# Patient Record
Sex: FEMALE
[De-identification: names, ages, dates, MRNs, and addresses within clinical notes are randomized per-mention and may not be internally consistent; named-entity substitution may affect disease eponyms.]

---

## 2019-12-05 ENCOUNTER — APPOINTMENT (OUTPATIENT)
Dept: HUMAN REPRODUCTION | Facility: CLINIC | Age: 44
End: 2019-12-05
Payer: COMMERCIAL

## 2019-12-05 PROCEDURE — 36415 COLL VENOUS BLD VENIPUNCTURE: CPT

## 2019-12-05 PROCEDURE — 76830 TRANSVAGINAL US NON-OB: CPT

## 2019-12-05 PROCEDURE — 99213 OFFICE O/P EST LOW 20 MIN: CPT | Mod: 25

## 2019-12-11 ENCOUNTER — APPOINTMENT (OUTPATIENT)
Dept: HUMAN REPRODUCTION | Facility: CLINIC | Age: 44
End: 2019-12-11
Payer: COMMERCIAL

## 2019-12-11 PROCEDURE — 76830 TRANSVAGINAL US NON-OB: CPT

## 2019-12-11 PROCEDURE — 99203 OFFICE O/P NEW LOW 30 MIN: CPT | Mod: 25

## 2019-12-11 PROCEDURE — 36415 COLL VENOUS BLD VENIPUNCTURE: CPT

## 2019-12-13 ENCOUNTER — APPOINTMENT (OUTPATIENT)
Dept: HUMAN REPRODUCTION | Facility: CLINIC | Age: 44
End: 2019-12-13
Payer: COMMERCIAL

## 2019-12-13 PROCEDURE — 76830 TRANSVAGINAL US NON-OB: CPT

## 2019-12-13 PROCEDURE — 36415 COLL VENOUS BLD VENIPUNCTURE: CPT

## 2019-12-16 ENCOUNTER — APPOINTMENT (OUTPATIENT)
Dept: HUMAN REPRODUCTION | Facility: CLINIC | Age: 44
End: 2019-12-16
Payer: COMMERCIAL

## 2019-12-16 PROCEDURE — 36415 COLL VENOUS BLD VENIPUNCTURE: CPT

## 2019-12-16 PROCEDURE — 76830 TRANSVAGINAL US NON-OB: CPT

## 2020-02-11 ENCOUNTER — APPOINTMENT (OUTPATIENT)
Dept: HUMAN REPRODUCTION | Facility: CLINIC | Age: 45
End: 2020-02-11
Payer: COMMERCIAL

## 2020-02-11 PROCEDURE — 36415 COLL VENOUS BLD VENIPUNCTURE: CPT

## 2020-02-11 PROCEDURE — 76830 TRANSVAGINAL US NON-OB: CPT

## 2020-02-13 ENCOUNTER — APPOINTMENT (OUTPATIENT)
Dept: HUMAN REPRODUCTION | Facility: CLINIC | Age: 45
End: 2020-02-13
Payer: COMMERCIAL

## 2020-02-13 PROCEDURE — 76830 TRANSVAGINAL US NON-OB: CPT

## 2020-02-13 PROCEDURE — 36415 COLL VENOUS BLD VENIPUNCTURE: CPT

## 2020-02-13 PROCEDURE — 99213 OFFICE O/P EST LOW 20 MIN: CPT | Mod: 25

## 2020-02-27 PROBLEM — Z00.00 ENCOUNTER FOR PREVENTIVE HEALTH EXAMINATION: Status: ACTIVE | Noted: 2020-02-27

## 2022-05-24 ENCOUNTER — OFFICE VISIT (OUTPATIENT)
Dept: RHEUMATOLOGY | Facility: CLINIC | Age: 47
End: 2022-05-24
Payer: COMMERCIAL

## 2022-05-24 ENCOUNTER — HOSPITAL ENCOUNTER (OUTPATIENT)
Dept: RADIOLOGY | Facility: HOSPITAL | Age: 47
Discharge: HOME | End: 2022-05-24
Attending: STUDENT IN AN ORGANIZED HEALTH CARE EDUCATION/TRAINING PROGRAM
Payer: COMMERCIAL

## 2022-05-24 ENCOUNTER — PATIENT MESSAGE (OUTPATIENT)
Dept: RHEUMATOLOGY | Facility: CLINIC | Age: 47
End: 2022-05-24

## 2022-05-24 VITALS
DIASTOLIC BLOOD PRESSURE: 70 MMHG | HEART RATE: 62 BPM | BODY MASS INDEX: 22.99 KG/M2 | SYSTOLIC BLOOD PRESSURE: 105 MMHG | WEIGHT: 138 LBS | HEIGHT: 65 IN

## 2022-05-24 DIAGNOSIS — M25.542 ARTHRALGIA OF BOTH HANDS: ICD-10-CM

## 2022-05-24 DIAGNOSIS — O03.9 MISCARRIAGE: ICD-10-CM

## 2022-05-24 DIAGNOSIS — M25.541 ARTHRALGIA OF BOTH HANDS: ICD-10-CM

## 2022-05-24 DIAGNOSIS — O03.9 MISCARRIAGE: Primary | ICD-10-CM

## 2022-05-24 PROCEDURE — 73130 X-RAY EXAM OF HAND: CPT | Mod: TC,50

## 2022-05-24 PROCEDURE — 73630 X-RAY EXAM OF FOOT: CPT | Mod: 26,,, | Performed by: RADIOLOGY

## 2022-05-24 PROCEDURE — 99204 OFFICE O/P NEW MOD 45 MIN: CPT | Mod: S$GLB,,, | Performed by: STUDENT IN AN ORGANIZED HEALTH CARE EDUCATION/TRAINING PROGRAM

## 2022-05-24 PROCEDURE — 99999 PR PBB SHADOW E&M-NEW PATIENT-LVL III: ICD-10-PCS | Mod: PBBFAC,,, | Performed by: STUDENT IN AN ORGANIZED HEALTH CARE EDUCATION/TRAINING PROGRAM

## 2022-05-24 PROCEDURE — 73130 XR HAND COMPLETE 3 VIEWS BILATERAL: ICD-10-PCS | Mod: 26,,, | Performed by: RADIOLOGY

## 2022-05-24 PROCEDURE — 73630 X-RAY EXAM OF FOOT: CPT | Mod: TC,50

## 2022-05-24 PROCEDURE — 99204 PR OFFICE/OUTPT VISIT, NEW, LEVL IV, 45-59 MIN: ICD-10-PCS | Mod: S$GLB,,, | Performed by: STUDENT IN AN ORGANIZED HEALTH CARE EDUCATION/TRAINING PROGRAM

## 2022-05-24 PROCEDURE — 73630 XR FOOT COMPLETE 3 VIEW BILATERAL: ICD-10-PCS | Mod: 26,,, | Performed by: RADIOLOGY

## 2022-05-24 PROCEDURE — 99999 PR PBB SHADOW E&M-NEW PATIENT-LVL III: CPT | Mod: PBBFAC,,, | Performed by: STUDENT IN AN ORGANIZED HEALTH CARE EDUCATION/TRAINING PROGRAM

## 2022-05-24 PROCEDURE — 73130 X-RAY EXAM OF HAND: CPT | Mod: 26,,, | Performed by: RADIOLOGY

## 2022-05-24 NOTE — PROGRESS NOTES
Subjective:       Patient ID: Alexandra Leon is a 47 y.o. female.    Chief Complaint: miscarriages    HPI   Patient is a 48 yo F with chronic constipation and fertility difficulty who presents for Rheumatology evaluation for miscarriage. She's from [place] but lives in [place] and frequently travels to [place]. She's doing IVF and had 2 implanted embryos fail. Both embryos were genetically tested and were normal. One embryo didn't implant at all and the 2nd one miscarried after 3 weeks. She also has joint pain and her paternal grandmother has RA so she was asked to have Rheumatology evaluation for any autoimmune disease that would cause infertility or pregnancy losses.    She endorses pain in the wrists, fingers, and right great toe. Pain in wrists in the morning and worse with activity like when carrying heavy objects. Hand pain in the fingers which is worse with activity. Denies morning stiffness. Swelling in the hands in the mornings only and has trouble making a fist and taking off her rings in the mornings. Swelling might come back if she eats something salty.    Right big toe pain one day in the middle of running. Pain is worse with walking and cracking the big knuckle sometimes feels better. Never got an XR for it. She has chronic constipation; got colonoscopy 15 yrs ago with tortuous colon and adenoma.    No fever, chills, weight loss, chest pain, dyspnea, cough, abdominal pain, nausea, vomiting, diarrhea. She is always fatigued. She has persistent throat-clearing since getting COVID 1 month ago. Had fatigue and cough and a day of chills and headaches with COVID. She has been having infertility problems since age 40 and was not trying to get pregnant before that age. She is being worked up for possible endometriosis and now doing IVF. She is scotty-menopausal.    No skin rashes, malar rash, photosensitivity   No telangiectasias   No calcinosis   No psoriasis   No patchy alopecia   No oral or  "nasal ulcers   No dry eyes or dry mouth   No pleurisy or any cardiopulmonary complaints   No dysphagia, diplopia, dysphonia  No muscle weakness   No n/v/d. +constipation   No acid reflux  No Raynaud's  No digital ulcers   No cytopenias   No renal issues   No blood clots   No fever, chills, night sweats, weight loss, or loss of appetite   +pregnancy losses, pre-term deliveries, or pregnancy complications (pregnant twice as in HPI)  No new onset headaches   No recurrent conjunctivitis, uveitis, scleritis, or episcleritis   No chronic or bloody diarrhea. No Ulcerative Colitis or Chron's (IBD)  No vaginal or penile and urethral discharge/STDs/ulcers   No unexplained lymphadenopathy  No parotitis   No seizures, strokes, psychosis  No sclerodactyly  No puffy hands  No perioral tightness       FHx: paternal GM with RA, father has OA, sister has narcolepsy and patient might have it too but testing was negative  Social Hx: never smoker, works in TV and film production    Review of Systems   Constitutional: Negative for fever and unexpected weight change.   HENT: Negative for mouth sores and trouble swallowing.    Eyes: Negative for redness.   Respiratory: Negative for cough and shortness of breath.    Cardiovascular: Negative for chest pain.   Gastrointestinal: Positive for constipation. Negative for diarrhea.   Genitourinary: Negative for dysuria and genital sores.   Skin: Negative for rash.   Neurological: Negative for headaches.   Hematological: Bruises/bleeds easily.         Objective:   /70   Pulse 62   Ht 5' 4.8" (1.646 m)   Wt 62.6 kg (138 lb)   BMI 23.11 kg/m²      Physical Exam   Constitutional: She is oriented to person, place, and time. No distress.   HENT:   Head: Normocephalic and atraumatic.   Eyes: Pupils are equal, round, and reactive to light.   Cardiovascular: Normal rate and regular rhythm.   No murmur heard.  Pulmonary/Chest: Effort normal and breath sounds normal. No respiratory distress. She " has no wheezes.   Abdominal: Soft. Bowel sounds are normal. There is no abdominal tenderness.   Musculoskeletal:         General: No deformity.      Right shoulder: Normal.      Left shoulder: Normal.      Right elbow: Normal.      Left elbow: Normal.      Right wrist: Normal.      Left wrist: Normal.      Cervical back: Normal range of motion.      Right knee: Normal.      Left knee: Normal.      Comments: No synovitis. No joint tenderness in the hands. She has pain in the great toe plantar aspect with strong palpation. Strength intact and symmetric in upper and lower extremities.    Neurological: She is alert and oriented to person, place, and time.   Skin: Skin is warm and dry. No rash noted.   Psychiatric: Affect and judgment normal.       Right Side Rheumatological Exam     Examination finds the shoulder, elbow, wrist, knee, 1st PIP, 1st MCP, 2nd PIP, 2nd MCP, 3rd PIP, 3rd MCP, 4th PIP, 4th MCP, 5th PIP and 5th MCP normal.    Muscle Strength (0-5 scale):  Neck Flexion:  5  Neck Extension: 5  Deltoid:  5  Biceps: 5/5   Triceps:  5  : 5/5   Iliopsoas: 5  Quadriceps:  5   Distal Lower Extremity: 5    Left Side Rheumatological Exam     Examination finds the shoulder, elbow, wrist, knee, 1st PIP, 1st MCP, 2nd PIP, 2nd MCP, 3rd PIP, 3rd MCP, 4th PIP, 4th MCP, 5th PIP and 5th MCP normal.    Muscle Strength (0-5 scale):  Neck Flexion:  5  Neck Extension: 5  Deltoid:  5  Biceps: 5/5   Triceps:  5  :  5/5   Iliopsoas: 5  Quadriceps:  5   Distal Lower Extremity: 5           No data to display     Assessment:       1. Miscarriage    2. Arthralgia of both hands            Plan:       Problem List Items Addressed This Visit    None     Visit Diagnoses     Miscarriage    -  Primary    Relevant Orders    COMPREHENSIVE METABOLIC PANEL (Completed)    Sedimentation rate (Completed)    C-REACTIVE PROTEIN (Completed)    X-Ray Hand Complete Bilateral (Completed)    RHEUMATOID FACTOR (Completed)    X-Ray Foot Complete  Bilateral (Completed)    Beta-2 Glycoprotein Abs (IgA, IgG, IgM)    Cardiolipin antibody    DRVVT    Arthralgia of both hands        Relevant Orders    COMPREHENSIVE METABOLIC PANEL (Completed)    Sedimentation rate (Completed)    C-REACTIVE PROTEIN (Completed)    X-Ray Hand Complete Bilateral (Completed)    RHEUMATOID FACTOR (Completed)    X-Ray Foot Complete Bilateral (Completed)    Beta-2 Glycoprotein Abs (IgA, IgG, IgM)    Cardiolipin antibody    DRVVT        Patient is a 46 yo F with chronic constipation and fertility difficulty who presents for Rheumatology evaluation for miscarriage. She's from [place] but lives in [place] and frequently travels to [place]. She's doing IVF and had 2 implanted embryos fail. Both embryos were genetically tested and were normal. One embryo didn't implant at all and the 2nd one miscarried after 3 weeks.     Her symptoms do not suggest a systemic autoimmune disease. She showed me labs on her phone from [place] as follows:   3/16/22 labs done in [place]: ESR, CRP, CCP, YAQUELIN. CBC, BMP, TSH were all normal or negative.    She does have hand and great toe pain and family history of RA. Will complete the workup with labs and XRs.    Plan:  1. Repeat inflammatory markers. Check RF, APS labs, CMP  2. XR complete hands and feet  3. RTC 3 months      Obed Carrasco MD  Rheumatology Fellow  PGY-4

## 2022-05-24 NOTE — PROGRESS NOTES
Pre chart    Answers for HPI/ROS submitted by the patient on 5/24/2022  fever: No  eye redness: No  mouth sores: No  headaches: No  shortness of breath: No  chest pain: No  trouble swallowing: No  diarrhea: No  constipation: Yes  unexpected weight change: No  genital sore: No  dysuria: No  During the last 3 days, have you had a skin rash?: No  Bruises or bleeds easily: Yes  cough: No

## 2022-06-06 ENCOUNTER — PATIENT MESSAGE (OUTPATIENT)
Dept: RHEUMATOLOGY | Facility: CLINIC | Age: 47
End: 2022-06-06
Payer: COMMERCIAL

## 2022-06-06 DIAGNOSIS — R74.8 LOW SERUM ALKALINE PHOSPHATASE: Primary | ICD-10-CM

## 2023-02-23 ENCOUNTER — HOSPITAL ENCOUNTER (OUTPATIENT)
Dept: RADIOLOGY | Facility: CLINIC | Age: 48
Discharge: HOME | End: 2023-02-23
Attending: STUDENT IN AN ORGANIZED HEALTH CARE EDUCATION/TRAINING PROGRAM
Payer: COMMERCIAL

## 2023-02-23 DIAGNOSIS — R74.8 LOW SERUM ALKALINE PHOSPHATASE: ICD-10-CM

## 2023-02-23 PROCEDURE — 77080 DXA BONE DENSITY AXIAL: CPT | Mod: TC

## 2023-02-23 PROCEDURE — 77080 DXA BONE DENSITY AXIAL: CPT | Mod: 26,,, | Performed by: INTERNAL MEDICINE

## 2023-02-23 PROCEDURE — 77080 DEXA BONE DENSITY SPINE HIP: ICD-10-PCS | Mod: 26,,, | Performed by: INTERNAL MEDICINE
